# Patient Record
Sex: MALE | Race: WHITE
[De-identification: names, ages, dates, MRNs, and addresses within clinical notes are randomized per-mention and may not be internally consistent; named-entity substitution may affect disease eponyms.]

---

## 2018-02-19 ENCOUNTER — HOSPITAL ENCOUNTER (EMERGENCY)
Dept: HOSPITAL 25 - UCEAST | Age: 63
Discharge: HOME | End: 2018-02-19
Payer: COMMERCIAL

## 2018-02-19 VITALS — DIASTOLIC BLOOD PRESSURE: 66 MMHG | SYSTOLIC BLOOD PRESSURE: 133 MMHG

## 2018-02-19 DIAGNOSIS — I25.2: ICD-10-CM

## 2018-02-19 DIAGNOSIS — E78.5: ICD-10-CM

## 2018-02-19 DIAGNOSIS — Z95.5: ICD-10-CM

## 2018-02-19 DIAGNOSIS — R05: ICD-10-CM

## 2018-02-19 DIAGNOSIS — R03.0: ICD-10-CM

## 2018-02-19 DIAGNOSIS — R50.9: Primary | ICD-10-CM

## 2018-02-19 PROCEDURE — G0463 HOSPITAL OUTPT CLINIC VISIT: HCPCS

## 2018-02-19 PROCEDURE — 99202 OFFICE O/P NEW SF 15 MIN: CPT

## 2018-02-19 PROCEDURE — 87502 INFLUENZA DNA AMP PROBE: CPT

## 2018-02-19 NOTE — UC
Respiratory Complaint HPI





- HPI Summary


HPI Summary: 





Pt presents with 3 weeks of a dry cough and intermittent fevers. He has been 

taking dayquill and nyquill for his symptoms with mild relief at times. He has 

not taken his temperature, but says that he will wake up sweating in the middle 

of the night and feel cold. Over the last 3-4 days his cough has subsided and 

he thought he was getting better. Last week he only felt feverish for 2 days 

off and on. He is still working and is very active - plays tennis multiple 

times a week. He tells me that about 8 months ago, he had an MI and underwent 

cardiac cath with stent placement. He denies sore throat, sinus symptoms, cough

, SOB, chest pain, palpitations, abdominal pain, n/v/d/c, fatigue, weakness, or 

body aches.





- History of Current Complaint


Chief Complaint: UCGeneralIllness


Stated Complaint: FEVER, AND CHILLS


Hx Obtained From: Patient


Timing: Constant


Pain Intensity: 0


Character: Cough: Nonproductive





- Allergies/Home Medications


Allergies/Adverse Reactions: 


 Allergies











Allergy/AdvReac Type Severity Reaction Status Date / Time


 


No Known Allergies Allergy   Verified 02/19/18 19:26











Home Medications: 


 Home Medications





Atorvastatin* [Lipitor*] 20 mg PO 1700 02/19/18 [History Confirmed 02/19/18]


Clopidogrel TAB* [Plavix TAB*] 75 mg PO DAILY 02/19/18 [History Confirmed 02/19/ 18]











PMH/Surg Hx/FS Hx/Imm Hx


Previously Healthy: Yes


Endocrine History: Dyslipidemia


Cardiovascular History: Myocardial Infarction





- Surgical History


Surgical History: None





- Family History


Known Family History: Positive: Cardiac Disease





- Social History


Occupation: Employed Full-time


Lives: With Family


Alcohol Use: Rare


Substance Use Type: None


Smoking Status (MU): Never Smoked Tobacco





Review of Systems


Constitutional: Fever


Skin: Negative


Eyes: Negative


ENT: Negative


Respiratory: Negative


Cardiovascular: Negative


Gastrointestinal: Negative


Motor: Negative


Neurovascular: Negative


Musculoskeletal: Negative


Neurological: Negative


Psychological: Negative


All Other Systems Reviewed And Are Negative: Yes





Physical Exam


Triage Information Reviewed: Yes


Appearance: Well-Appearing, No Pain Distress, Well-Nourished


Vital Signs: 


 Initial Vital Signs











Temp  101.1 F   02/19/18 19:20


 


Pulse  97   02/19/18 19:20


 


Resp  16   02/19/18 19:20


 


BP  133/66   02/19/18 19:20


 


Pulse Ox  100   02/19/18 19:20











Vital Signs Reviewed: Yes


Eyes: Positive: Conjunctiva Clear.  Negative: Conjunctiva Inflamed, Discharge


ENT: Positive: Hearing grossly normal, Pharynx normal, TMs normal, Uvula 

midline.  Negative: Pharyngeal erythema, Nasal congestion, Nasal drainage, TM 

bulging, TM dull, TM red, Tonsillar swelling, Tonsillar exudate, Hoarse voice, 

Sinus tenderness


Neck: Positive: Supple, Nontender, No Lymphadenopathy


Respiratory: Positive: Chest non-tender, Lungs clear, Normal breath sounds, No 

respiratory distress, No accessory muscle use


Cardiovascular: Positive: RRR, No Murmur, Pulses Normal, Other: - No JVD or 

carotid bruit


Abdomen Description: Positive: Nontender, No Organomegaly, Soft.  Negative: 

Bruit, CVA Tenderness (R), CVA Tenderness (L), Distended, Guarding, Pulsatile 

Mass


Bowel Sounds: Positive: Present


Neurological: Positive: Alert.  Negative: Fatigued


Psychological: Positive: Age Appropriate Behavior


Skin: Negative: rashes, significant lesion(s)





UC Diagnostic Evaluation





- Laboratory


O2 Sat by Pulse Oximetry: 100





Respiratory Course/Dx





- Course


Course Of Treatment: POC flu negative.  I suspect his fever could be from an 

unknown viral illness, but given his recent cardiac history - I spoke with Dr. Lloyd and we agreed to draw a CBC, CMP, and blood cultures. I will start him 

on an antibiotic to cover for potential infectious process and have him f/u 

with his PCP within 1 week.





- Differential Dx/Diagnosis


Provider Diagnoses: Fever.  Cough





Discharge





- Discharge Plan


Condition: Stable


Disposition: HOME


Prescriptions: 


Amoxicillin/Clavulanate TAB* [Augmentin *] 875 mg PO BID #20 tab


Patient Education Materials:  Fever in Adults (ED)


Referrals: 


No Primary Care Phys,NOPCP [Primary Care Provider] - 


Additional Instructions: 


If you develop a worsening fever, shortness of breath, chest pain, new or 

worsening symptoms - please call your PCP or go to the ED.


 





Your blood pressure was high at todays visit. Please see your primary provider 

within 4 weeks for recheck and re-evaluation.

## 2019-07-31 ENCOUNTER — HOSPITAL ENCOUNTER (EMERGENCY)
Dept: HOSPITAL 25 - UCCORT | Age: 64
Discharge: HOME | End: 2019-07-31
Payer: COMMERCIAL

## 2019-07-31 VITALS — SYSTOLIC BLOOD PRESSURE: 142 MMHG | DIASTOLIC BLOOD PRESSURE: 68 MMHG

## 2019-07-31 DIAGNOSIS — I10: ICD-10-CM

## 2019-07-31 DIAGNOSIS — I25.2: ICD-10-CM
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
## 2019-07-31 DIAGNOSIS — L03.115: ICD-10-CM
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
## 2019-07-31 DIAGNOSIS — S80.11XA: Primary | ICD-10-CM

## 2019-07-31 PROCEDURE — G0463 HOSPITAL OUTPT CLINIC VISIT: HCPCS

## 2019-07-31 PROCEDURE — 99212 OFFICE O/P EST SF 10 MIN: CPT

## 2019-07-31 NOTE — UC
Skin Complaint HPI





- HPI Summary


HPI Summary: 





64 yo male hit right lower leg with truck door 2 weeks ago resulting in hematoma





one week ago bee sting in same area





now here for evaluation of red rash lower leg





on plavix





- History of Current Complaint


Chief Complaint: UCSkin


Time Seen by Provider: 07/31/19 08:20


Stated Complaint: RT LEG SKIN COMPLAINT


Hx Obtained From: Patient


Onset/Duration: Sudden Onset, Lasting Weeks


Skin Exposure Onset/Duration: Minutes Ago


Onset Severity: Mild


Current Severity: None


Pain Intensity: 0


Pain Scale Used: 0-10 Numeric


Location: Discrete


Character: Redness


Aggravating Factor(s): Nothing


Alleviating Factor(s): Nothing


Associated Signs & Symptoms: Positive: Negative


Related History: Trauma - it did break skin





- Allergy/Home Medications


Allergies/Adverse Reactions: 


 Allergies











Allergy/AdvReac Type Severity Reaction Status Date / Time


 


amoxicillin [From Augmentin] Allergy  Swelling Verified 07/31/19 08:18





   Of  





   Face,Lips,&  





   Throat  


 


clavulanic acid Allergy  Swelling Verified 07/31/19 08:18





[From Augmentin]   Of  





   Face,Lips,&  





   Throat  











Home Medications: 


 Home Medications





Amlodipine Besylate [Norvasc] 5 mg PO DAILY 07/31/19 [History Confirmed 07/31/19

]


Aspirin 81 mg CHEW TAB* [Aspirin Low Dose TAB*] 81 mg PO DAILY 07/31/19 [

History Confirmed 07/31/19]


Losartan Potassium [Cozaar] 50 mg PO DAILY 07/31/19 [History Confirmed 07/31/19]











PMH/Surg Hx/FS Hx/Imm Hx


Previously Healthy: Yes


Cardiovascular History: Hypertension, Myocardial Infarction





- Surgical History


Surgical History: None


Surgery Procedure, Year, and Place: Stents





- Family History


Known Family History: Positive: Cardiac Disease, Hypertension





- Social History


Alcohol Use: Occasionally


Substance Use Type: None


Smoking Status (MU): Never Smoked Tobacco





Review of Systems


All Other Systems Reviewed And Are Negative: Yes


Constitutional: Positive: Negative


Skin: Positive: Rash


Eyes: Positive: Negative


ENT: Positive: Negative


Respiratory: Positive: Negative


Cardiovascular: Positive: Negative


Gastrointestinal: Positive: Negative


Genitourinary: Positive: Negative


Motor: Positive: Negative


Neurovascular: Positive: Negative


Musculoskeletal: Positive: Negative


Neurological: Positive: Negative


Psychological: Positive: Negative





Physical Exam


Triage Information Reviewed: Yes


Appearance: Well-Appearing, No Pain Distress, Well-Nourished


Vital Signs: 


 Initial Vital Signs











Temp  97.4 F   07/31/19 08:11


 


Pulse  55   07/31/19 08:11


 


Resp  16   07/31/19 08:11


 


BP  142/68   07/31/19 08:11


 


Pulse Ox  100   07/31/19 08:11











Vital Signs Reviewed: Yes


Eyes: Positive: Conjunctiva Clear


ENT: Positive: Hearing grossly normal.  Negative: Nasal congestion, Nasal 

drainage, Trismus, Muffled voice, Hoarse voice


Dental Exam: Normal


Neck: Positive: Supple, Nontender


Respiratory: Positive: Lungs clear, Normal breath sounds, No respiratory 

distress


Cardiovascular: Positive: RRR, No Murmur


Musculoskeletal: Positive: Other: - see image


Neurological: Positive: Alert


Skin: Positive: Other - see image





Images


Front/Back of Body, Lg (Mono): 


  __________________________














  __________________________





 1 - hematoma





 2 - erythema








Course/Dx





- Course


Course Of Treatment: 





this may be due to resolving hematoma however due to break in skin will cover 

for cellulitis





had angioedema in past with augmentin





- Diagnoses


Provider Diagnosis: 


 Traumatic hematoma of right lower leg, Cellulitis of right leg without foot








Discharge





- Sign-Out/Discharge


Documenting (check all that apply): Patient Departure


All imaging exams completed and their final reports reviewed: No Studies





- Discharge Plan


Condition: Stable


Disposition: HOME


Prescriptions: 


DOXYcycline CAP(*) [DOXYcycline 100MG CAP(*)] 100 mg PO BID #14 cap


Patient Education Materials:  Cellulitis (ED)


Referrals: 


No Primary Care Phys,NOPCP [Primary Care Provider] - 


Additional Instructions: 


take doxy with meal











- Billing Disposition and Condition


Condition: STABLE


Disposition: Home

## 2019-07-31 NOTE — XMS REPORT
Continuity of Care Document (CCD)

 Created on:2019



Patient:Michelet Mares

Sex:Male

:1955

External Reference #:[id number]





Demographics







 Address  [address] [place] [address]

 

 Home Phone  [phone]

 

 Mobile Phone  [phone]

 

 Email Address  [email or web address]

 

 Preferred Language  en

 

 Marital Status  Not  or 

 

 Mosque Affiliation  Unknown

 

 Race  White

 

 Ethnic Group  Not  or 









Author







 Name  Donald Lemus M.D., [facility]

 

 Address  [address]



   Unavailable



   [place] [address]









Support







 Name  Relationship  Address  Phone

 

 Lesly Mares  Wife  Unavailable  [phone]









Care Team Providers







 Name  Role  Phone

 

 Donald Lemus MD [facility]  Care Team Information   Unavailable









Payers







 Date  Identification Numbers  Payment Provider  Subscriber

 

 Effective: 2015  Policy Number: [id number]  Tay Mares









 PayID: [id number]  [address] [place] [id number]







Problems







 Active Problems  Provider  Date

 

 Essential hypertension  Donald Lemus M.D.,  Onset: 2019



   [facility]  

 

 Aortic valve disorder  Donald Lemus M.D.,  Onset: 2019



   [facility]  

 

 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



 native coronary artery without angina  [facility]  



 pectoris    

 

 Hyperlipidemia  Donald Lemus M.D.,  Onset: 08/15/2017



   [facility]  

 

 Mixed hyperlipidemia  Telma Veloz, MSN,  Onset: 10/10/2018



   FNP  







Social History







 Type  Date  Description  Comments

 

 Birth Sex    Unknown  

 

 Lives With    Wife  

 

 Diet    Inadequate intake of salt  

 

 Occupation      

 

 ADL's/IADL's    Independent with all ADL's  

 

 Tobacco Use  Start: Unknown  Never Smoked Cigarettes  

 

 Smoking Status  Reviewed: 19  Never Smoked Cigarettes  

 

 ETOH Use    Rarely consumes alcohol  







Allergies, Adverse Reactions, Alerts







 Active Allergies  Reaction  Severity  Comments  Date

 

 Amoxicillin  swollen lip      2019









 Inactive Allergies









 NKDA        08/15/2017







Medications







 Active Medications  SIG  Qnty  Indications  Ordering Provider  Date

 

 Lipitor  1 by mouth every  90tabs  E78.5  Veloz, Telma  10/10/2018



       80mg Tablets  night at bedtime      BALDO Cody,  



         FNP  

 

 Losartan Potassium  1 by mouth every  90tabs  I10  Veloz, Telma  10/10/2018



                  50mg  day      BALDO Cody,  



 Tablets        FNP  



           

 

 Nitrostat  1 tab sl every 5  25tabs    Veloz, Telma  10/10/2018



         0.4mg Tablets  min x3 chest pain      BALDO Cody,  



 Sub        FNP  

 

 Amlodipine Besylate  1 by mouth every  90tabs    Donald Lemus  2018



                   5mg  day      TRINIDAD CAMACHO, [facility]  



 Tablets          



           

 

 Aspirin Adult Low  1 by mouth every      Unknown  



 Dose  day        



    81mg Tablets DR          



           

 

 Plavix  1 by mouth every  90tabs    Donald Lemus  



      75mg Tablets  day      TRINIDAD CAMACHO, [facility]  



           









 History Medications









 Lipitor  1 by mouth      Unknown   -



      10mg Tablets  every day        08/15/2017



           

 

 Amlodipine Besylate  1 by mouth      Unknown   -



                  5mg  every day        Unknown



 Tablets          



           

 

 Metoprolol Tartrate  1 by mouth      Unknown   -



                  25mg  twice a day        08/15/2017



 Tablets          



           

 

 Lipitor  tab by mouth  90tabs  E78.5  Donald Lemus   -



      20mg Tablets  every day      TRINIDAD CAMACHO, [facility]  10/10/2018



           

 

 Nitroglycerin  apply 1 patch      Unknown   -



            0.4mg/HR  at dinner time        Unknown



 Patches 24HR  on for 12 hours        



   and off for 12        



   hours        







Vital Signs







 Date  Vital  Result  Comment

 

 2019  9:39am  BP Systolic Sitting Left Arm  130 mmHg  









 BP Diastolic Sitting Left Arm  70 mmHg  

 

 Heart Rate  64 /min  

 

 Respiratory Rate  16 /min  

 

 Height  70 inches  5'10"

 

 Weight  166.00 lb  

 

 BMI (Body Mass Index)  23.8 kg/m2  

 

 BSA (Body Surface Area)  1.93 m2  

 

 Ideal body weight in kilograms  75 kg  

 

 O2 % BldC Oximetry  98 %  

 

 Ejection Fraction  60%  









 2019 11:30am  BP Systolic Sitting Left Arm  144 mmHg  









 BP Diastolic Sitting Left Arm  88 mmHg  

 

 Heart Rate  60 /min  

 

 Respiratory Rate  18 /min  

 

 Height  70 inches  5'10"

 

 Weight  170.00 lb  

 

 BMI (Body Mass Index)  24.4 kg/m2  

 

 BSA (Body Surface Area)  1.95 m2  

 

 Ideal body weight in kilograms  75 kg  

 

 O2 % BldC Oximetry  98 %  Ora









 10/10/2018  2:14pm  BP Systolic Sitting Right Arm  144 mmHg  









 BP Diastolic Sitting Right Arm  82 mmHg  

 

 Heart Rate  69 /min  

 

 Respiratory Rate  16 /min  

 

 Height  70 inches  5'10"

 

 Weight  165.00 lb  

 

 BMI (Body Mass Index)  23.7 kg/m2  

 

 BSA (Body Surface Area)  1.92 m2  

 

 Ideal body weight in kilograms  75 kg  

 

 O2 % BldC Oximetry  98 %  Room air









 08/15/2018  8:59am  BP Systolic Sitting Left Arm  118 mmHg  









 BP Diastolic Sitting Left Arm  86 mmHg  

 

 Heart Rate  68 /min  

 

 Respiratory Rate  18 /min  

 

 Height  70 inches  5'10"

 

 Weight  163.00 lb  

 

 BMI (Body Mass Index)  23.4 kg/m2  

 

 BSA (Body Surface Area)  1.91 m2  

 

 Ideal body weight in kilograms  75 kg  

 

 O2 % BldC Oximetry  95 %  









 08/15/2017  1:04pm  BP Systolic Sitting Right Arm  118 mmHg  









 BP Diastolic Sitting Right Arm  62 mmHg  

 

 Heart Rate  56 /min  

 

 Respiratory Rate  16 /min  

 

 Height  70 inches  5'10"

 

 Weight  161.00 lb  

 

 BMI (Body Mass Index)  23.1 kg/m2  

 

 BSA (Body Surface Area)  1.90 m2  

 

 Ideal body weight in kilograms  75 kg  







Results







 Test  Date  Facility  Test  Result  H/L  Range  Note

 

 LDL Cholesterol  2019  [facility]  Cholesterol  140 mg/dL    <200  1, 2



 Profile    [address], [place] [id number] [phone]          









 Triglycerides  46 mg/dL    <150  3

 

 HDL Cholesterol  53 mg/dL    >40  4

 

 LDL-Cholesterol  78 mg/dL    < 100  5









 Comprehensive  2019  [facility]  Glucose  93 mg/dL  Normal    



 Metabolic Panel    [address], [place] [id number] [phone]          









 BUN  20 mg/dL  High  7-18  

 

 Creatinine  1.1 mg/dL  Normal  0.6-1.3  

 

 Glom Filtration Rate, Estimate  >60 mL/min    >60  

 

 If African American  >60 mL/min    >60  6

 

 BUN/Creat  18.1 ratio      

 

 Sodium  138 mmol/L  Normal  136-145  

 

 Potassium  4.1 mmol/L  Normal  3.5-5.1  

 

 Chloride  106 mmol/L  Normal    

 

 Carbon Dioxide  27 mmol/L  Normal  21-32  

 

 Anion Gap  5 mEq/L  Low  8-16  

 

 Calcium  8.3 mg/dL  Low  8.5-10.1  

 

 Total Protein  7.4 g/dL  Normal  6.4-8.2  

 

 Albumin  3.8 g/dL  Normal  3.4-5.0  

 

 Globulin  3.6 g/dL  Normal  1.9-4.3  

 

 Alb/Glob  1.1 ratio      

 

 Bilirubin,Total  0.5 mg/dL  Normal  0.2-1.0  

 

 Sgot/Ast  30 U/L  Normal  15-37  

 

 SGPT/Alt  48 U/L  Normal  12-78  

 

 Alkaline Phosphatase  71 U/L  Normal    









 CBC W/Automated  2019  [facility]  White Blood  4.7 K/uL  Normal  3.4-10.5  



 Diff    [address]  Count        



     [place] [address] [phone]          









 Red Blood Count  4.77 M/uL  Normal  4.20-5.80  

 

 Hemoglobin  14.5 gm/dL  Normal  12.8-17.0  

 

 Hematocrit  43.3 %  Normal  38.0-48.0  

 

 Mean Cell Volume  90.8 fl  Normal  80.0-96.0  

 

 Mean Corpuscular HGB  30.4 pg  Normal  27.0-33.0  

 

 Mean Corpuscular HGB Conc  33.5 g/dL  Normal  31.7-36.0  

 

 Platelet Count  244 K/uL  Normal  155-360  

 

 Red Cell Distri Width SD  45.3 fl  Normal  36-51  

 

 Red Cell Distri Width %CV  14.0 %  Normal  11.6-15.8  

 

 Mean Platelet Volume  9.6 fL  Normal  6.6-10.6  

 

 Neut%  57.9 %  Normal  33.0-73.0  

 

 Lymph %  29.0 %  Normal  20.0-42.0  

 

 Mono %  9.2 %  Normal  0.0-10.0  

 

 Eo%  3.7 %  Normal  0.0-6.6  

 

 Bas%  0.2 %  Normal  0.0-1.1  

 

 Neut#  2.69 K/uL  Normal  1.8-7.0  

 

 Lymph #  1.35 K/uL  Normal  1.0-4.0  

 

 Mono #  0.43 K/uL  Normal  0.0-0.8  

 

 Eos #  0.17 K/uL  Normal  0.0-0.5  

 

 Baso #  0.01 K/uL  Normal  0.0-0.1  









 LDL Cholesterol  2018  [facility]  Cholesterol  176 mg/dL    <200  7, 8



 Profile    [address]          



     [place] [address]          



     [phone]          









 Triglycerides  125 mg/dL    <150  9

 

 HDL Cholesterol  47 mg/dL    >40  10

 

 LDL-Cholesterol  104 mg/dL    < 100  11

 

 Reflex add FT3?  Y      

 

 Reflex add FT4?  Y      









 TSH Reflex  2018  [facility]  Thyroid Stim  1.68 uIU/mL  Normal  0.30-4.20  



 FT4 And/Or    [address]  Hormone        



 [place]    [place] [address] [phone]          









 Reflex add FT3?  Y      

 

 Reflex add FT4?  Y      









 Basic Metabolic Panel - Daily  2017  N2N/CCD Import  Anion Gap  8 mmol/L
    7 - 16  









 BUN/Creatinine Ratio  15.8 Ratio    10.0 - 20.0  

 

 Calcium  8.3 mg/dL  Low  8.4 - 10.2  

 

 Chloride  104 mmol/L    100 - 108  

 

 Co2  26 mmol/L    22 - 31  

 

 Creatinine  1.01 mg/dL    0.80 - 1.30  

 

 GFR MDRD Af Amer  >60    >59 ml/min/1.73m2  

 

 GFR MDRD Non Af Amer  >60    >59 ml/min/1.73m2  

 

 Glom Filt Rate, Est  See Notes      

 

 Glucose  99 mg/dL    70 - 99  

 

 Potassium  4.3 mmol/L    3.6 - 5.2  

 

 Sodium  138 mmol/L    136 - 145  

 

 Urea nitrogen  16 mg/dL    7 - 24  









 Troponin I  2017  N2N/CCD Import  Troponin I  34.80 ng/mL  Critical high
  0.00 -  



             0.10  

 

 CBC - Daily  2017  N2N/CCD Import  Hematocrit  41.1 %    41.0 -  



             53.0  









 Hemoglobin  14.1 g/dL    13.5 - 18.0  

 

 MCH  31.4 pg    27.0 - 32.0  

 

 MCHC  34.4 g/dL    32.0 - 36.0  

 

 MCV  91.3 fL    80.0 - 95.0  

 

 MPV  7.8 fL    7.1 - 10.7  

 

 Platelets  252 10*3/uL    150 - 450  

 

 RBC  4.51 10*6/uL  Low  4.60 - 6.10  

 

 RDW  13.7 %    10.5 - 14.5  

 

 WBC  7.5 10*3/uL    4.1 - 11.0  









 aPTT  2017  N2N/CCD Import  aPTT  24.9 s    22.0 - 32.6  

 

 Protime-Inr  2017  N2N/CCD Import  Inr  1.08 1      









 Protime  11.2 s    9.2 - 11.9  









 Magnesium  2017  N2N/CCD Import  Magnesium  2.1 mg/dL    1.7 - 2.4  

 

 Lipid panel  2017  N2N/CCD Import  Chol/HDL Ratio  4.5 Ratio      









 Cholesterol  215 mg/dL  High  0 - 200  

 

 HDL  48 mg/dL    >40  

 

 LDL Calculated  154 mg/dL  High  <130  

 

 Triglycerides  64 mg/dL    30 - 200  









 Lipase  2017  N2N/CCD Import  Lipase  78 U/L    65 - 230  

 

 Comprehensive  2017  N2N/CCD Import  Alb/Glob ratio  1.2 Ratio      



 metabolic panel              









 Albumin  3.7 g/dL    3.2 - 4.5  

 

 Alkaline Phosphatase  54 U/L    45 - 117  

 

 Alt  30 U/L    12 - 78  

 

 Anion Gap  11 mmol/L    7 - 16  

 

 Ast  34 U/L    11 - 39  

 

 BUN/Creatinine Ratio  21.3 Ratio  High  10.0 - 20.0  

 

 Bilirubin, Total  0.7 mg/dL    0.0 - 1.0  

 

 Calcium  8.6 mg/dL    8.4 - 10.2  

 

 Chloride  106 mmol/L    100 - 108  

 

 Co2  23 mmol/L    22 - 31  

 

 Creatinine  0.94 mg/dL    0.80 - 1.30  

 

 GFR MDRD Af Amer  >60    >59 ml/min/1.73m2  

 

 GFR MDRD Non Af Amer  >60    >59 ml/min/1.73m2  

 

 Globulin  3.0 g/dL    2.7 - 4.3  

 

 Glom Filt Rate, Est  See Notes      

 

 Glucose  152 mg/dL  High  70 - 99  

 

 Potassium  4.0 mmol/L    3.6 - 5.2  

 

 Protein, Total  6.7 g/dL    6.4 - 8.2  

 

 Sodium  140 mmol/L    136 - 145  

 

 Urea nitrogen  20 mg/dL    7 - 24  









 CBC and  2017  N2N/CCD Import  Basophils  0.0 10*3/uL    0.0 - 0.2  



 differential      Absolute        









 Basophils Relative  0.3 %    0.0 - 4.0  

 

 Eosinophils Absolute  0.0 10*3/uL    0.0 - 0.5  

 

 Eosinophils Relative  0.1 %    0.0 - 5.0  

 

 Hematocrit  40.9 %  Low  41.0 - 53.0  

 

 Hemoglobin  13.8 g/dL    13.5 - 18.0  

 

 Lymphocytes Absolute  0.6 10*3/uL  Low  1.2 - 4.8  

 

 Lymphocytes Relative  5.9 %  Low  16.0 - 52.0  

 

 MCH  30.5 pg    27.0 - 32.0  

 

 MCHC  33.9 g/dL    32.0 - 36.0  

 

 MCV  90.2 fL    80.0 - 95.0  

 

 MPV  7.5 fL    7.1 - 10.7  

 

 Monocytes Absolute  0.5 10*3/uL    0.0 - 0.8  

 

 Monocytes Relative  4.6 %    0.0 - 8.0  

 

 Neutrophils %  89.1 %  High  35.0 - 75.0  

 

 Neutrophils Absolute  9.7 10*3/uL  High  1.8 - 7.7  

 

 Platelets  258 10*3/uL    150 - 450  

 

 RBC  4.53 10*6/uL  Low  4.60 - 6.10  

 

 RDW  13.3 %    10.5 - 14.5  

 

 WBC  10.9 10*3/uL    4.1 - 11.0  









 1  E78.5  I10  I25.10

 

 2  Reference Guidelines*:



   Desirable: ........... < 200 mg/dL



   Borderline High: ..... 200-239 mg/dL



   High: ................ >=240 mg/dL



   



   * The National Cholesterol Education Program (NCEP)

 

 3  Reference Guidelines*:



   Normal: ............. < 150 mg/dL



   Borderline High: .... 150-199 mg/dL



   High: ............... 200-499 mg/dL



   Very High: .......... > 500 mg/dL



   * Source: National Cholesterol Education Program (NCEP)

 

 4  Reference Guidelines*:



   Low HDL: ..... < 40 mg/dL



   Normal:  ..... 40-60 mg/dL



   Desirable: ... > 60 mg/dL



   



   *The National Cholesterol Education Program(NCEP)

 

 5  Reference Guidelines*:



   Optimal:........... <100 mg/dL



   Near Optimal....... 100-129 mg/dL



   Borderline High.... 130-159 mg/dL



   High............... 160-189 mg/dL



   Very High.......... >=190 mg/dL



   * Source: National Cholesterol Education Program (NCEP)

 

 6  Note:



   Persistent reduction for 3 months or more in an eGFR <60



   mL/min/1.73 m2 defines CKD.  Patients with eGFR values >/=60



   mL/min/1.73 m2 may also have CKD if evidence of persistent



   proteinuria is present.



   



   The original MDRD equation for estimated GFR is not valid



   for patients less than 18 years of age.



   



   Additional information may be found at www.kdoqi.org.

 

 7  E78.5,I25.10

 

 8  Reference Guidelines*:



   Desirable: ........... < 200 mg/dL



   Borderline High: ..... 200-239 mg/dL



   High: ................ >=240 mg/dL



   



   * The National Cholesterol Education Program (NCEP)

 

 9  Reference Guidelines*:



   Normal: ............. < 150 mg/dL



   Borderline High: .... 150-199 mg/dL



   High: ............... 200-499 mg/dL



   Very High: .......... > 500 mg/dL



   * Source: National Cholesterol Education Program (NCEP)

 

 10  Reference Guidelines*:



   Low HDL: ..... < 40 mg/dL



   Normal:  ..... 40-60 mg/dL



   Desirable: ... > 60 mg/dL



   



   *The National Cholesterol Education Program(NCEP)

 

 11  Reference Guidelines*:



   Optimal:........... <100 mg/dL



   Near Optimal....... 100-129 mg/dL



   Borderline High.... 130-159 mg/dL



   High............... 160-189 mg/dL



   Very High.......... >=190 mg/dL



   * Source: National Cholesterol Education Program (NCEP)







Procedures







 Date  Code  Description  Status

 

 2019  57052  Echocardiogram Complete  Completed

 

 10/10/2018  81545  EKG-Tracing And Report  Completed

 

 2018  53474  ECHO Transthoracic Inc Performance Continuous  Completed



     Electrocardio Mon  

 

 08/15/2018  27783  EKG-Tracing And Report  Completed

 

 2017  94269  Echocardiogram Complete  Completed

 

 08/15/2017  45262  EKG-Tracing And Report  Completed







Encounters







 Type  Date  Location  Provider  Dx  Diagnosis

 

 Office Visit  2019  Cardiology Office  Donald Lemus  I35.0  
Nonrheumatic aortic



   9:40a    TRINIDAD CAMACHO, FACC    (valve) stenosis









 I25.10  Athscl heart disease of native coronary artery w/o ang pctrs

 

 E78.5  Hyperlipidemia, unspecified

 

 I10  Essential (primary) hypertension









 Office Visit  2019 11:20a  Cardiology Office  Donald Lemus  I25.10  
[facility]



       TRINIDAD CAMACHO, FACC    disease of



           native



           coronary



           artery w/o ang



           pctrs









 I35.0  Nonrheumatic aortic (valve) stenosis

 

 E78.5  Hyperlipidemia, unspecified

 

 I10  Essential (primary) hypertension









 Office Visit  10/10/2018  2:20p  Cardiology Office  Telma Veloz  I25.10  
[facility]



       Glo, BALDO,    disease of



       FNP    native



           coronary



           artery w/o ang



           pctrs









 I35.0  Nonrheumatic aortic (valve) stenosis

 

 E78.5  Hyperlipidemia, unspecified

 

 I10  Essential (primary) hypertension









 Office Visit  08/15/2018  9:00a  Cardiology Office  Telma Veloz  I25.10  
[facility]



       BALDO Cody,    disease of



       FNP    native



           coronary



           artery w/o ang



           pctrs









 E78.5  Hyperlipidemia, unspecified

 

 I35.0  Nonrheumatic aortic (valve) stenosis









 Office Visit  08/15/2017  1:00p  Cardiology Office  Donald Lemus  I25.10  
[facility] heart



       TRINIDAD CAMACHO, FACC    disease of



           native



           coronary



           artery w/o ang



           pctrs









 E78.5  Hyperlipidemia, unspecified







Plan of Treatment

Future Appointment(s):2020  9:00 am - Telma Veloz, BALDO, FNP at 
Cardiology [id number] - Donald Lemus M.D., FACCI35.0 Nonrheumatic 
aortic (valve) stenosisNew Orders:Echocardiogram, Ordered: 19Comments:
Mild to moderate. Since he is young he is likely to progress and require SAVR 
or TAVR. Will have echo in 6 months.I25.10 Atherosclerotic heart disease of 
native coronary artery withComments:No symptoms after stenting. Will continue 
DAPT for undetermined amount of time based on the new trials that show better 
outcome with DAPT even after the year from the stenting.E78.5 Hyperlipidemia, 
unspecifiedComments:On statin. Last LDL 2019 was 78I10 Essential (primary) 
hypertensionComments:BP goal is &lt; 130/80 mmHg. Controlled. No 
changesAllFollow up:Follow up visit in 6 months.